# Patient Record
(demographics unavailable — no encounter records)

---

## 2025-02-16 NOTE — PHYSICAL EXAM
[Alert] : alert [No Acute Distress] : no acute distress [Normocephalic] : normocephalic [EOMI Bilateral] : EOMI bilateral [Clear tympanic membranes with bony landmarks and light reflex present bilaterally] : clear tympanic membranes with bony landmarks and light reflex present bilaterally  [Pink Nasal Mucosa] : pink nasal mucosa [Nonerythematous Oropharynx] : nonerythematous oropharynx [Supple, full passive range of motion] : supple, full passive range of motion [No Palpable Masses] : no palpable masses [Clear to Auscultation Bilaterally] : clear to auscultation bilaterally [Regular Rate and Rhythm] : regular rate and rhythm [Normal S1, S2 audible] : normal S1, S2 audible [No Murmurs] : no murmurs [+2 Femoral Pulses] : +2 femoral pulses [Soft] : soft [NonTender] : non tender [Non Distended] : non distended [Normoactive Bowel Sounds] : normoactive bowel sounds [No Hepatomegaly] : no hepatomegaly [No Splenomegaly] : no splenomegaly [No Abnormal Lymph Nodes Palpated] : no abnormal lymph nodes palpated [Normal Muscle Tone] : normal muscle tone [No Gait Asymmetry] : no gait asymmetry [No pain or deformities with palpation of bone, muscles, joints] : no pain or deformities with palpation of bone, muscles, joints [Cranial Nerves Grossly Intact] : cranial nerves grossly intact [No Rash or Lesions] : no rash or lesions [de-identified] : SMR exam declined by patient [de-identified] : + 8 degree curve lumbar spine

## 2025-02-16 NOTE — HISTORY OF PRESENT ILLNESS
[Grade: ____] : Grade: [unfilled] [Eats regular meals including adequate fruits and vegetables] : eats regular meals including adequate fruits and vegetables [Drinks non-sweetened liquids] : drinks non-sweetened liquids  [Calcium source] : calcium source [Has concerns about body or appearance] : does not have concerns about body or appearance [Uses electronic nicotine delivery system] : does not use electronic nicotine delivery system [Uses tobacco] : does not use tobacco [Uses drugs] : does not use drugs  [Drinks alcohol] : does not drink alcohol [Has peer relationships free of violence] : has peer relationships free of violence [No] : Patient has not had sexual intercourse [Has ways to cope with stress] : has ways to cope with stress [Displays self-confidence] : displays self-confidence [Has problems with sleep] : does not have problems with sleep [Gets depressed, anxious, or irritable/has mood swings] : does not get depressed, anxious, or irritable/has mood swings [Has thought about hurting self or considered suicide] : has not thought about hurting self or considered suicide [de-identified] : lives with parents, 12 year old twin brother, 15 yo brother [de-identified] : dance, gymnastics [de-identified] : rates mood 4/10 but when doing things she likes like playing with her cat, mood is 6/10; overall mood is good, no concerns per mom [FreeTextEntry1] : 12 year old female for annual PE. Switching care to our office.   7th grade, likes technology Plays video games ([employer])  PMHx: none PSHx: none Fam Hx: father with factor 11 deficiency, patient was tested at Heme at [facility] [place] and negative.  Menarche: not yet  At last PE in July 2023, weight at 40th percentile and height at 10th percentile.

## 2025-02-16 NOTE — HISTORY OF PRESENT ILLNESS
[Grade: ____] : Grade: [unfilled] [Eats regular meals including adequate fruits and vegetables] : eats regular meals including adequate fruits and vegetables [Drinks non-sweetened liquids] : drinks non-sweetened liquids  [Calcium source] : calcium source [Has concerns about body or appearance] : does not have concerns about body or appearance [Uses electronic nicotine delivery system] : does not use electronic nicotine delivery system [Uses tobacco] : does not use tobacco [Uses drugs] : does not use drugs  [Drinks alcohol] : does not drink alcohol [Has peer relationships free of violence] : has peer relationships free of violence [No] : Patient has not had sexual intercourse [Has ways to cope with stress] : has ways to cope with stress [Displays self-confidence] : displays self-confidence [Has problems with sleep] : does not have problems with sleep [Gets depressed, anxious, or irritable/has mood swings] : does not get depressed, anxious, or irritable/has mood swings [Has thought about hurting self or considered suicide] : has not thought about hurting self or considered suicide [de-identified] : lives with parents, 12 year old twin brother, 13 yo brother [de-identified] : dance, gymnastics [de-identified] : rates mood 4/10 but when doing things she likes like playing with her cat, mood is 6/10; overall mood is good, no concerns per mom [FreeTextEntry1] : 12 year old female for annual PE. Switching care to our office.   7th grade, likes technology Plays video games ([employer])  PMHx: none PSHx: none Fam Hx: father with factor 11 deficiency, patient [place] [facility] [place].  Menarche: not yet  At last PE in July 2023, weight at 40th percentile and height at 10th percentile.

## 2025-02-16 NOTE — DISCUSSION/SUMMARY
[Physical Growth and Development] : physical growth and development [Social and Academic Competence] : social and academic competence [Emotional Well-Being] : emotional well-being [Risk Reduction] : risk reduction [Violence and Injury Prevention] : violence and injury prevention [FreeTextEntry1] : 12 year old female for annual PE.  1. AG as per above 2. Discussed pubertal timing. Notes breast buds 1 years ago--discussed can expect menarche within 2-3 years of breast buds.  3. Vaccines UTD. HPV vaccine declined. 4. Referred to Ortho for scoliosis 5. HCM labs 6. RTC 1 year or sooner if any concerns

## 2025-02-16 NOTE — PHYSICAL EXAM
[Alert] : alert [No Acute Distress] : no acute distress [Normocephalic] : normocephalic [EOMI Bilateral] : EOMI bilateral [Clear tympanic membranes with bony landmarks and light reflex present bilaterally] : clear tympanic membranes with bony landmarks and light reflex present bilaterally  [Pink Nasal Mucosa] : pink nasal mucosa [Nonerythematous Oropharynx] : nonerythematous oropharynx [Supple, full passive range of motion] : supple, full passive range of motion [No Palpable Masses] : no palpable masses [Clear to Auscultation Bilaterally] : clear to auscultation bilaterally [Regular Rate and Rhythm] : regular rate and rhythm [Normal S1, S2 audible] : normal S1, S2 audible [No Murmurs] : no murmurs [+2 Femoral Pulses] : +2 femoral pulses [Soft] : soft [NonTender] : non tender [Non Distended] : non distended [Normoactive Bowel Sounds] : normoactive bowel sounds [No Hepatomegaly] : no hepatomegaly [No Splenomegaly] : no splenomegaly [No Abnormal Lymph Nodes Palpated] : no abnormal lymph nodes palpated [Normal Muscle Tone] : normal muscle tone [No Gait Asymmetry] : no gait asymmetry [No pain or deformities with palpation of bone, muscles, joints] : no pain or deformities with palpation of bone, muscles, joints [Cranial Nerves Grossly Intact] : cranial nerves grossly intact [No Rash or Lesions] : no rash or lesions [de-identified] : SMR exam declined by patient [de-identified] : + 8 degree curve lumbar spine

## 2025-05-23 NOTE — DATA REVIEWED
[de-identified] : AP and lateral spine radiographs were ordered, obtained, and independently reviewed in clinic on 05/22/2025 depicting mild scoliosis with right thoracic curve of 12 degrees and left thoracolumbar curve of 13 degrees. Patient is Risser 1. Postural kyphosis of 57 degrees shown on the lateral plane. No evidence of spondylolysis or spondylolisthesis.

## 2025-05-23 NOTE — ASSESSMENT
[FreeTextEntry1] : Eduardo is a 11 y/o female with moderate scoliosis and moderate postural kyphosis.  Today's assessment was performed with the assistance of the patient's parent as an independent historian given the patient's age. We discussed at length the natural history, etiology, pathoanatomy and treatment modalities of scoliosis with patient and parent. Clinical findings and x-ray results were reviewed at length with the patient and parent. Patient's obtained radiographs are remarkable for right thoracic curve of 12 degrees and left thoracolumbar curve of 13 degrees. Also, lateral films showed moderate postural kyphosis of 57 degrees. Patient is 11 y/o and Risser 1. Given patient has significant spinal growth remaining, it is possible for patients curve to progress. Explained to family that scoliotic curvatures under 10 degrees do not require any orthopedic intervention. Curvatures over 10 degrees are closely monitored for progression, while curvatures over 25 degrees are typically treated with a bracing regimen to prevent further progression. For curvatures with magnitude of 40 degrees or more, surgical intervention is warranted. At this time, we will only continue with close observation of patient's progression at this time. For her postural kyphosis, I am recommending patient begin an at-home exercise regimen for back and core strengthening exercises. I have provided a sample sheet with exercises to be performed 5 days each week for 20-30 minutes each day. Sample exercises were demonstrated during today's visit. Additionally, I am recommending the patient begin attending physical therapy sessions for back and core strengthening exercises; a new prescription was provided to family today. All questions and concerns were addressed. Patient and parent vocalized understanding and agreement to assessment and treatment plan. We will plan to see EDUARDO back in clinic in approximately 6 months for reevaluation with repeat AP and lateral scoliosis x-rays.  Natural history of spine deformity discussed. Risk of progression explained. Spine deformity can cause back pain later on and also arthritis, though usually later. Spine deformity can affect organ systems, such as lungs, less commonly heart and GI etc over time depending on curve size and progression. Deformity can progress with growth but can continue to progress later on based on the size of the curve. It can also effect patient's height due to the curve..It usually does not impact activities and has no limitations, however activities may be limited due to pain or rarely breathlessness with large curves. Scoliosis is usually not impacted by daily activities- sleeping position, sitting position, lifting heavy weights etc, however posture and back pain can be affected by some of these.Stretching, exercises, bone health and nutrition are important factors in the long run. Spine deformity may have genetics etiology and so siblings and progenies should be evaluated.For scoliosis, curves less than 25 degrees are usually managed with observation. Bracing is warranted for curves measuring greater than 25 degrees with skeletal growth remaining. Braces do not correct curves permanently and there is a 30% risk brace failure. Surgery is recommended for scoliosis measuring greater than 45 degrees.  Parent served as the primary historian regarding the above information for this visit to corroborate the patient's history. We also discussed/instructed back, core strengthening and posture correction exercises and going over the proper form as well the need to be regular on a daily basis. Importance was discussed and instructions printed.  I, Femi Summers, have acted as a scribe and documented the above information for Dr. Nicholson on 05/22/2025.  We spent 45 minutes on HPI, Clinical exam, ordering/ reviewing all imaging, reviewing any existing record, reviewing findings and counseling patient to treatment, differentials, etiology, prognosis, natural history, implications on ADLs, activities limitations/modifications, answering questions and addressing concerns, treatment goals and documenting in the EHR.

## 2025-05-23 NOTE — PHYSICAL EXAM
[FreeTextEntry1] : General: Patient is awake and alert and in no acute distress . oriented to person, place, and time. well developed, well nourished, cooperative. Skin: The skin is intact, warm, pink, and dry over the area examined.   Eyes: normal conjunctiva, normal eyelids and pupils were equal and round.   ENT: normal ears, normal nose and normal lips.   Cardiovascular: There is brisk capillary refill in the digits of the affected extremity. They are symmetric pulses in the bilateral upper and lower extremities, positive peripheral pulses, brisk capillary refill, but no peripheral edema.   Respiratory: The patient is in no apparent respiratory distress. They're taking full deep breaths without use of accessory muscles or evidence of audible wheezes or stridor without the use of a stethoscope, normal respiratory effort.   Musculoskeletal:. Examination of the back reveals mild shoulder asymmetry with right shoulder higher than left.  Right scapula is more prominent than left.  Minimal flank asymmetry.  On forward bending, left thoracic and left thoracolumbar prominence noted.  Patient is able to bend forward and touch the toes as well bend backwards without pain.  Lateral flexion is symmetrical and is pain free.  Straight leg raising test is free to more than 70 degrees. Moderate postural kyphosis, fully correctable on hyperextension.   Neurological examination reveals a grade 5/5 muscle power.  Sensation is intact to crude touch and pinprick.  Deep tendon reflexes are 1+ with ankle jerk and knee jerk.  The plantars are bilaterally down going.  Superficial abdominal reflexes are symmetric and intact.  The biceps and triceps reflexes are 1+.   There is no hairy patch, lipoma, sinus in the back.  There is no pes cavus, asymmetry of calves, significant leg length discrepancy or significant cafe-au-lait spots.   Child is able to walk on tiptoes as well as heels without difficulty or pain. Child is able to jump and squat

## 2025-05-23 NOTE — REASON FOR VISIT
[Initial Evaluation] : an initial evaluation [Mother] : mother [FreeTextEntry1] : scoliosis evaluation

## 2025-05-23 NOTE — REVIEW OF SYSTEMS
[NI] : Endocrine [Nl] : Hematologic/Lymphatic [Change in Activity] : no change in activity [Fever Above 102] : no fever [Nosebleeds] : no epistaxis [Joint Pains] : no arthralgias [Back Pain] : ~T no back pain

## 2025-05-23 NOTE — ASSESSMENT
[FreeTextEntry1] : Eduardo is a 13 y/o female with moderate scoliosis and moderate postural kyphosis.  Today's assessment was performed with the assistance of the patient's parent as an independent historian given the patient's age. We discussed at length the natural history, etiology, pathoanatomy and treatment modalities of scoliosis with patient and parent. Clinical findings and x-ray results were reviewed at length with the patient and parent. Patient's obtained radiographs are remarkable for right thoracic curve of 12 degrees and left thoracolumbar curve of 13 degrees. Also, lateral films showed moderate postural kyphosis of 57 degrees. Patient is 13 y/o and Risser 1. Given patient has significant spinal growth remaining, it is possible for patients curve to progress. Explained to family that scoliotic curvatures under 10 degrees do not require any orthopedic intervention. Curvatures over 10 degrees are closely monitored for progression, while curvatures over 25 degrees are typically treated with a bracing regimen to prevent further progression. For curvatures with magnitude of 40 degrees or more, surgical intervention is warranted. At this time, we will only continue with close observation of patient's progression at this time. For her postural kyphosis, I am recommending patient begin an at-home exercise regimen for back and core strengthening exercises. I have provided a sample sheet with exercises to be performed 5 days each week for 20-30 minutes each day. Sample exercises were demonstrated during today's visit. Additionally, I am recommending the patient begin attending physical therapy sessions for back and core strengthening exercises; a new prescription was provided to family today. All questions and concerns were addressed. Patient and parent vocalized understanding and agreement to assessment and treatment plan. We will plan to see EDUARDO back in clinic in approximately 6 months for reevaluation with repeat AP and lateral scoliosis x-rays.  Natural history of spine deformity discussed. Risk of progression explained. Spine deformity can cause back pain later on and also arthritis, though usually later. Spine deformity can affect organ systems, such as lungs, less commonly heart and GI etc over time depending on curve size and progression. Deformity can progress with growth but can continue to progress later on based on the size of the curve. It can also effect patient's height due to the curve..It usually does not impact activities and has no limitations, however activities may be limited due to pain or rarely breathlessness with large curves. Scoliosis is usually not impacted by daily activities- sleeping position, sitting position, lifting heavy weights etc, however posture and back pain can be affected by some of these.Stretching, exercises, bone health and nutrition are important factors in the long run. Spine deformity may have genetics etiology and so siblings and progenies should be evaluated.For scoliosis, curves less than 25 degrees are usually managed with observation. Bracing is warranted for curves measuring greater than 25 degrees with skeletal growth remaining. Braces do not correct curves permanently and there is a 30% risk brace failure. Surgery is recommended for scoliosis measuring greater than 45 degrees.  Parent served as the primary historian regarding the above information for this visit to corroborate the patient's history. We also discussed/instructed back, core strengthening and posture correction exercises and going over the proper form as well the need to be regular on a daily basis. Importance was discussed and instructions printed.  I, Femi Summers, have acted as a scribe and documented the above information for Dr. Nicholson on 05/22/2025.  We spent 45 minutes on HPI, Clinical exam, ordering/ reviewing all imaging, reviewing any existing record, reviewing findings and counseling patient to treatment, differentials, etiology, prognosis, natural history, implications on ADLs, activities limitations/modifications, answering questions and addressing concerns, treatment goals and documenting in the EHR.

## 2025-05-23 NOTE — DATA REVIEWED
[de-identified] : AP and lateral spine radiographs were ordered, obtained, and independently reviewed in clinic on 05/22/2025 depicting mild scoliosis with right thoracic curve of 12 degrees and left thoracolumbar curve of 13 degrees. Patient is Risser 1. Postural kyphosis of 57 degrees shown on the lateral plane. No evidence of spondylolysis or spondylolisthesis.

## 2025-05-23 NOTE — HISTORY OF PRESENT ILLNESS
[FreeTextEntry1] : EDUARDO is a 12-year-old female who presents today with her mother for initial evaluation of his spine. Mother reports that their pediatrician recently noticed asymmetries of the back at annual visit and advised family to follow up with an orthopedist. Patient is premenarchal; mom denies significant growth in height. Patient denies any recent fevers, chills, or night sweats. Denies any recent trauma or injuries. She denies any back pain, radiating pain, numbness, tingling sensations, discomfort, weakness to LE, radiating LE pain, or bladder/bowel dysfunction. She has been participating in all her normal physical activities without restrictions or discomfort. Of note, family history of scoliosis with older brother. Here today for further orthopedic evaluation.  The patient's HPI was reviewed thoroughly with patient and parent. The patient's parent has acted as an independent historian regarding the above information due to the unreliable nature of the history obtained from the patient.